# Patient Record
Sex: FEMALE | Race: WHITE | Employment: FULL TIME | URBAN - METROPOLITAN AREA
[De-identification: names, ages, dates, MRNs, and addresses within clinical notes are randomized per-mention and may not be internally consistent; named-entity substitution may affect disease eponyms.]

---

## 2022-09-28 ENCOUNTER — HOSPITAL ENCOUNTER (OUTPATIENT)
Dept: MAMMOGRAPHY | Facility: CLINIC | Age: 42
Discharge: HOME | End: 2022-09-28
Attending: OBSTETRICS & GYNECOLOGY | Admitting: OBSTETRICS & GYNECOLOGY
Payer: COMMERCIAL

## 2022-09-28 DIAGNOSIS — Z12.31 VISIT FOR SCREENING MAMMOGRAM: ICD-10-CM

## 2022-09-28 PROCEDURE — 77067 SCR MAMMO BI INCL CAD: CPT

## 2022-09-30 ENCOUNTER — HOSPITAL ENCOUNTER (OUTPATIENT)
Dept: ULTRASOUND IMAGING | Facility: CLINIC | Age: 42
Discharge: HOME | End: 2022-09-30
Attending: OBSTETRICS & GYNECOLOGY
Payer: COMMERCIAL

## 2022-09-30 ENCOUNTER — HOSPITAL ENCOUNTER (OUTPATIENT)
Dept: MAMMOGRAPHY | Facility: CLINIC | Age: 42
Discharge: HOME | End: 2022-09-30
Attending: OBSTETRICS & GYNECOLOGY
Payer: COMMERCIAL

## 2022-09-30 DIAGNOSIS — R92.8 ABNORMAL MAMMOGRAM: ICD-10-CM

## 2022-09-30 PROCEDURE — 76642 ULTRASOUND BREAST LIMITED: CPT | Mod: LT

## 2022-09-30 PROCEDURE — 77061 BREAST TOMOSYNTHESIS UNI: CPT | Mod: LT

## 2023-01-21 ENCOUNTER — HEALTH MAINTENANCE LETTER (OUTPATIENT)
Age: 43
End: 2023-01-21

## 2023-04-24 ENCOUNTER — HOSPITAL ENCOUNTER (OUTPATIENT)
Dept: MAMMOGRAPHY | Facility: CLINIC | Age: 43
Discharge: HOME | End: 2023-04-24
Attending: OBSTETRICS & GYNECOLOGY | Admitting: OBSTETRICS & GYNECOLOGY
Payer: COMMERCIAL

## 2023-04-24 DIAGNOSIS — R92.8 BI-RADS CATEGORY 3 MAMMOGRAM RESULT: ICD-10-CM

## 2023-04-24 PROCEDURE — 77061 BREAST TOMOSYNTHESIS UNI: CPT | Mod: LT

## 2023-12-15 ENCOUNTER — HOSPITAL ENCOUNTER (OUTPATIENT)
Dept: MAMMOGRAPHY | Facility: CLINIC | Age: 43
Discharge: HOME | End: 2023-12-15
Attending: OBSTETRICS & GYNECOLOGY | Admitting: OBSTETRICS & GYNECOLOGY
Payer: COMMERCIAL

## 2023-12-15 DIAGNOSIS — R92.8 BI-RADS CATEGORY 3 MAMMOGRAM RESULT: ICD-10-CM

## 2023-12-15 PROCEDURE — 77062 BREAST TOMOSYNTHESIS BI: CPT

## 2024-07-07 ENCOUNTER — HEALTH MAINTENANCE LETTER (OUTPATIENT)
Age: 44
End: 2024-07-07

## 2024-09-09 ENCOUNTER — OFFICE VISIT (OUTPATIENT)
Dept: URGENT CARE | Facility: URGENT CARE | Age: 44
End: 2024-09-09
Payer: COMMERCIAL

## 2024-09-09 VITALS
OXYGEN SATURATION: 97 % | SYSTOLIC BLOOD PRESSURE: 138 MMHG | TEMPERATURE: 98.7 F | DIASTOLIC BLOOD PRESSURE: 90 MMHG | HEART RATE: 83 BPM

## 2024-09-09 DIAGNOSIS — R06.2 WHEEZING: ICD-10-CM

## 2024-09-09 DIAGNOSIS — J01.90 ACUTE SINUSITIS WITH SYMPTOMS > 10 DAYS: Primary | ICD-10-CM

## 2024-09-09 PROCEDURE — 99203 OFFICE O/P NEW LOW 30 MIN: CPT | Mod: 25 | Performed by: PHYSICIAN ASSISTANT

## 2024-09-09 PROCEDURE — 94640 AIRWAY INHALATION TREATMENT: CPT | Performed by: PHYSICIAN ASSISTANT

## 2024-09-09 RX ORDER — PREDNISONE 20 MG/1
40 TABLET ORAL DAILY
Qty: 10 TABLET | Refills: 0 | Status: SHIPPED | OUTPATIENT
Start: 2024-09-09 | End: 2024-09-14

## 2024-09-09 RX ORDER — ALBUTEROL SULFATE 90 UG/1
1-2 AEROSOL, METERED RESPIRATORY (INHALATION) EVERY 6 HOURS
Qty: 8.5 G | Refills: 0 | Status: SHIPPED | OUTPATIENT
Start: 2024-09-09 | End: 2024-09-16

## 2024-09-09 RX ORDER — ALBUTEROL SULFATE 0.83 MG/ML
2.5 SOLUTION RESPIRATORY (INHALATION) ONCE
Status: COMPLETED | OUTPATIENT
Start: 2024-09-09 | End: 2024-09-09

## 2024-09-09 RX ORDER — BENZONATATE 100 MG/1
100 CAPSULE ORAL 3 TIMES DAILY PRN
Qty: 30 CAPSULE | Refills: 0 | Status: SHIPPED | OUTPATIENT
Start: 2024-09-09 | End: 2024-09-16

## 2024-09-09 RX ORDER — DOXYCYCLINE 100 MG/1
100 CAPSULE ORAL 2 TIMES DAILY
Qty: 14 CAPSULE | Refills: 0 | Status: SHIPPED | OUTPATIENT
Start: 2024-09-09 | End: 2024-09-16

## 2024-09-09 RX ADMIN — ALBUTEROL SULFATE 2.5 MG: 0.83 SOLUTION RESPIRATORY (INHALATION) at 18:59

## 2024-09-09 NOTE — PROGRESS NOTES
URGENT CARE VISIT:    SUBJECTIVE:   Regla Bosch is a 44 year old female presenting with a chief complaint of stuffy nose, cough - productive, facial pain/pressure, and headache.  Onset was 2 week(s) ago.   She denies the following symptoms: vomiting and diarrhea  Course of illness is worsening.    Treatment measures tried include Mucinex with no relief of symptoms.  Predisposing factors include None.    PMH:   Past Medical History:   Diagnosis Date    Mononucleosis age 16    Rh incompatibility      Allergies: Amoxicillin and Penicillins   Medications:   Current Outpatient Medications   Medication Sig Dispense Refill    albuterol (PROAIR HFA/PROVENTIL HFA/VENTOLIN HFA) 108 (90 Base) MCG/ACT inhaler Inhale 1-2 puffs into the lungs every 6 hours for 5 days. 8.5 g 0    benzonatate (TESSALON) 100 MG capsule Take 1 capsule (100 mg) by mouth 3 times daily as needed for cough. 30 capsule 0    doxycycline hyclate (VIBRAMYCIN) 100 MG capsule Take 1 capsule (100 mg) by mouth 2 times daily for 7 days. 14 capsule 0    predniSONE (DELTASONE) 20 MG tablet Take 2 tablets (40 mg) by mouth daily for 5 days. 10 tablet 0    acetaminophen (TYLENOL) 325 MG tablet Take 2 tablets (650 mg) by mouth every 4 hours as needed for mild pain or fever (greater than or equal to 38  C /100.4  F (oral) or 38.5  C/ 101.4  F (core).) 100 tablet     hydrocortisone 2.5 % cream Place rectally 3 times daily as needed (hemorrhoids)      ibuprofen (ADVIL,MOTRIN) 400 MG tablet Take 1-2 tablets (400-800 mg) by mouth every 6 hours as needed for other (cramping) 120 tablet     lanolin ointment Apply topically every hour as needed for dry skin (sore nipples)      Prenatal Vit-Fe Fumarate-FA (PRENATAL MULTIVITAMIN  PLUS IRON) 27-0.8 MG TABS Take 1 tablet by mouth daily      senna-docusate (SENOKOT-S;PERICOLACE) 8.6-50 MG per tablet Take 1-2 tablets by mouth 2 times daily 100 tablet      Social History:   Social History     Tobacco Use    Smoking status: Former      Current packs/day: 0.00     Types: Cigarettes     Quit date: 8/15/2010     Years since quittin.0    Smokeless tobacco: Not on file   Substance Use Topics    Alcohol use: No     Comment: OCC.     Family History:  I have reviewed this patient's family history and updated it with pertinent information if needed.  Family History   Problem Relation Age of Onset    Circulatory Father     Hypertension Father     Cerebrovascular Disease Sister         Twin sister    Ovarian Cancer Maternal Grandmother     Cancer Maternal Grandmother     C.A.D. Maternal Grandfather     Heart Disease Maternal Grandfather     Diabetes Paternal Grandfather     Arthritis Paternal Grandfather        ROS:  General: negative  Skin: negative  Eyes: negative  Ears/Nose/Throat: nasal congestion  Respiratory: Cough  Cardiovascular: negative  Gastrointestinal: negative  Genitourinary: negative  Musculoskeletal: negative  Neurologic: negative  Psychiatric: negative  Hematologic/Lymphatic/Immunologic: negative  Endocrine: negative      OBJECTIVE:  BP (!) 138/90 (BP Location: Right arm, Patient Position: Sitting, Cuff Size: Adult Large)   Pulse 83   Temp 98.7  F (37.1  C) (Tympanic)   SpO2 97%   GENERAL APPEARANCE: healthy, alert and no distress  HEAD: atraumatic  EYES: EOMI,  PERRL, conjunctiva clear  HENT: ear canals and TM's normal.  Nose and mouth without ulcers, erythema or lesions  NECK: supple, nontender, no lymphadenopathy  RESP: expiratory wheezes throughout. [place] louder on left upper lung lobe  CV: regular rates and rhythm, normal S1 S2, no murmur noted  SKIN: no suspicious lesions or rashes  NEURO: alert and oriented  PSYCH: normal mood and affect      ASSESSMENT:    ICD-10-CM    1. Acute sinusitis with symptoms > 10 days  J01.90 doxycycline hyclate (VIBRAMYCIN) 100 MG capsule      2. Wheezing  R06.2 albuterol (PROVENTIL) neb solution 2.5 mg     albuterol (PROAIR HFA/PROVENTIL HFA/VENTOLIN HFA) 108 (90 Base) MCG/ACT inhaler      predniSONE (DELTASONE) 20 MG tablet     benzonatate (TESSALON) 100 MG capsule          PLAN:  Patient Instructions   Patient was educated on the natural course of condition. Take medications as directed. Side effects discussed. Conservative measures discussed including rest, increased fluids, humidifier, and over-the-counter cough suppressants. See your primary care provider if symptoms do not improve in 5 days. Seek emergency care if you develop shortness of breath or fever over 103.    Patient verbalized understanding and is agreeable to plan. The patient was discharged ambulatory and in stable condition.    Pebbles Carrillo PA-C ....................  9/9/2024   7:12 PM

## 2024-09-10 NOTE — PATIENT INSTRUCTIONS
Patient was educated on the natural course of condition. Take medications as directed. Side effects discussed. Conservative measures discussed including rest, increased fluids, humidifier, and over-the-counter cough suppressants. See your primary care provider if symptoms do not improve in 5 days. Seek emergency care if you develop shortness of breath or fever over 103.

## 2024-09-10 NOTE — NURSING NOTE
Clinic Administered Medication Documentation    Patient was given albuterol sulfate 2.5mg/3ml. Prior to medication administration, verified patient's identity using patient's name and date of birth.    Jaime Desouza, CMA

## 2024-09-16 ENCOUNTER — OFFICE VISIT (OUTPATIENT)
Dept: URGENT CARE | Facility: URGENT CARE | Age: 44
End: 2024-09-16
Payer: COMMERCIAL

## 2024-09-16 VITALS
BODY MASS INDEX: 35.48 KG/M2 | OXYGEN SATURATION: 97 % | WEIGHT: 206.7 LBS | HEART RATE: 107 BPM | DIASTOLIC BLOOD PRESSURE: 91 MMHG | SYSTOLIC BLOOD PRESSURE: 133 MMHG | TEMPERATURE: 98 F | RESPIRATION RATE: 20 BRPM

## 2024-09-16 DIAGNOSIS — J01.00 ACUTE NON-RECURRENT MAXILLARY SINUSITIS: Primary | ICD-10-CM

## 2024-09-16 PROCEDURE — 99214 OFFICE O/P EST MOD 30 MIN: CPT | Performed by: FAMILY MEDICINE

## 2024-09-16 RX ORDER — CEFUROXIME AXETIL 500 MG/1
500 TABLET ORAL 2 TIMES DAILY
Qty: 14 TABLET | Refills: 0 | Status: SHIPPED | OUTPATIENT
Start: 2024-09-16 | End: 2024-09-23

## 2024-09-16 RX ORDER — PREDNISONE 20 MG/1
20 TABLET ORAL DAILY
Qty: 5 TABLET | Refills: 0 | Status: SHIPPED | OUTPATIENT
Start: 2024-09-16 | End: 2024-09-21

## 2024-09-16 NOTE — PROGRESS NOTES
Assessment & Plan     Acute non-recurrent maxillary sinusitis  - predniSONE (DELTASONE) 20 MG tablet  Dispense: 5 tablet; Refill: 0  - cefuroxime (CEFTIN) 500 MG tablet  Dispense: 14 tablet; Refill: 0       Vital signs unremarkable lungs continue to show mild wheezing and expiratory more prominent in the upper lung fields  We discussed the risk of starting another course of antibiotics which may include diarrhea or rash.    Given her ongoing sinus pressure and mild wheezing I extended her course of prednisone but at a smaller dose of 20 mg for 5 days.  She is to also start nasal fluticasone steroid spray to help with inflammation on exam she had a slight septal spur in the mid septum on the left side with associated inflamed turbinates compared to the right side.  No obvious polyps were seen.    Should symptoms not improve in the next 5 days a referral to ENT was provided to her    Ramón Sandy MD   [facility]    Taiwo Arauz is a 44 year old female who presents to clinic today for the following health issues:  Chief Complaint   Patient presents with    URI     Start 24 sx sometimes productive cough w/clear flem, chest congestion, head pressure, hard to hear, still not sleeping through the night tx Doxycycline, Tessalon, albuterol and completed the prednisone      HPI    Patient reporting marginal improvement with her sinus pressure mainly in the frontal region she continues to have some drainage and occasional productive cough.  She finished her course of prednisone which was given to her for 5 days additionally has been using albuterol from time to time.  Has had a handful of sinus infections in her lifetime but prior to this episode had been many years.  No known history of septal deviation.  She has no new shortness of breath or worsening cough.  No new fevers.        Patient Active Problem List    Diagnosis Date Noted     (normal spontaneous vaginal delivery) 2016      Priority: Medium    Indication for care in labor or delivery 06/10/2016     Priority: Medium       Current Outpatient Medications   Medication Sig Dispense Refill    albuterol (PROAIR HFA/PROVENTIL HFA/VENTOLIN HFA) 108 (90 Base) MCG/ACT inhaler Inhale 1-2 puffs into the lungs every 6 hours for 5 days. 8.5 g 0    benzonatate (TESSALON) 100 MG capsule Take 1 capsule (100 mg) by mouth 3 times daily as needed for cough. 30 capsule 0    cefuroxime (CEFTIN) 500 MG tablet Take 1 tablet (500 mg) by mouth 2 times daily for 7 days. 14 tablet 0    doxycycline hyclate (VIBRAMYCIN) 100 MG capsule Take 1 capsule (100 mg) by mouth 2 times daily for 7 days. 14 capsule 0    predniSONE (DELTASONE) 20 MG tablet Take 1 tablet (20 mg) by mouth daily for 5 days. 5 tablet 0    acetaminophen (TYLENOL) 325 MG tablet Take 2 tablets (650 mg) by mouth every 4 hours as needed for mild pain or fever (greater than or equal to 38  C /100.4  F (oral) or 38.5  C/ 101.4  F (core).) (Patient not taking: Reported on 9/16/2024) 100 tablet     hydrocortisone 2.5 % cream Place rectally 3 times daily as needed (hemorrhoids) (Patient not taking: Reported on 9/16/2024)      ibuprofen (ADVIL,MOTRIN) 400 MG tablet Take 1-2 tablets (400-800 mg) by mouth every 6 hours as needed for other (cramping) (Patient not taking: Reported on 9/16/2024) 120 tablet     lanolin ointment Apply topically every hour as needed for dry skin (sore nipples) (Patient not taking: Reported on 9/16/2024)      Prenatal Vit-Fe Fumarate-FA (PRENATAL MULTIVITAMIN  PLUS IRON) 27-0.8 MG TABS Take 1 tablet by mouth daily (Patient not taking: Reported on 9/16/2024)      senna-docusate (SENOKOT-S;PERICOLACE) 8.6-50 MG per tablet Take 1-2 tablets by mouth 2 times daily (Patient not taking: Reported on 9/16/2024) 100 tablet      No current facility-administered medications for this visit.           Objective    BP (!) 133/91   Pulse 107   Temp 98  F (36.7  C)   Resp 20   Wt 93.8 kg (206 lb  11.2 oz)   SpO2 97%   BMI 35.48 kg/m    Physical Exam       Nose: as noted above, no thick drainage  Pulm: as above , good aeration, no crackles  GEN: NAD  Throat: mild cobblestoning, no trismus  No results found for any visits on 09/16/24.                  The use of Dragon/Cyanogen[employer] services may have been used to construct the content in this note; any grammatical or spelling errors are non-intentional. Please contact the author of this note directly if you are in need of any clarification.

## 2024-09-16 NOTE — PATIENT INSTRUCTIONS
Nasal fluticasone/mometasone once a day  -- do this 15 minutes after your  nasal saline rinse      Continue the albuterol 2 puffs every 4 hours as needed for wheezing/coughing fits      Prednisone 20mg a day for 5 more days      Ceftin antibiotic twice a day for 7 days        If not better call [facility] [phone]

## 2025-05-04 ENCOUNTER — OFFICE VISIT (OUTPATIENT)
Dept: URGENT CARE | Facility: URGENT CARE | Age: 45
End: 2025-05-04
Payer: COMMERCIAL

## 2025-05-04 VITALS
OXYGEN SATURATION: 98 % | HEIGHT: 64 IN | DIASTOLIC BLOOD PRESSURE: 88 MMHG | TEMPERATURE: 101.1 F | HEART RATE: 101 BPM | RESPIRATION RATE: 20 BRPM | SYSTOLIC BLOOD PRESSURE: 143 MMHG | WEIGHT: 209.1 LBS | BODY MASS INDEX: 35.7 KG/M2

## 2025-05-04 DIAGNOSIS — R50.9 FEVER IN ADULT: ICD-10-CM

## 2025-05-04 DIAGNOSIS — J02.9 ACUTE PHARYNGITIS, UNSPECIFIED ETIOLOGY: Primary | ICD-10-CM

## 2025-05-04 DIAGNOSIS — R07.0 THROAT PAIN: ICD-10-CM

## 2025-05-04 LAB
DEPRECATED S PYO AG THROAT QL EIA: NEGATIVE
FLUAV AG SPEC QL IA: NEGATIVE
FLUBV AG SPEC QL IA: NEGATIVE

## 2025-05-04 PROCEDURE — 3077F SYST BP >= 140 MM HG: CPT | Performed by: INTERNAL MEDICINE

## 2025-05-04 PROCEDURE — 99213 OFFICE O/P EST LOW 20 MIN: CPT | Performed by: INTERNAL MEDICINE

## 2025-05-04 PROCEDURE — 3079F DIAST BP 80-89 MM HG: CPT | Performed by: INTERNAL MEDICINE

## 2025-05-04 PROCEDURE — 87804 INFLUENZA ASSAY W/OPTIC: CPT | Performed by: INTERNAL MEDICINE

## 2025-05-04 PROCEDURE — 87651 STREP A DNA AMP PROBE: CPT | Performed by: INTERNAL MEDICINE

## 2025-05-04 RX ORDER — LIDOCAINE HYDROCHLORIDE 20 MG/ML
15 SOLUTION OROPHARYNGEAL
Qty: 100 ML | Refills: 0 | Status: SHIPPED | OUTPATIENT
Start: 2025-05-04

## 2025-05-04 NOTE — PROGRESS NOTES
Urgent Care Clinic Visit    Chief Complaint   Patient presents with    Urgent Care    Pharyngitis     Sore throat last night, feel nausea, and feels swollen.               5/4/2025     5:36 PM   Additional Questions   Roomed by Dawb   Accompanied by self     No  Does the patient have a sore throat and either history of fever >100.4 in the previous 24 hours without a cough or recent exposure to a known case of strep throat? Yes   Pre-Provider Visit Orders- Influenza  Is this currently Influenza testing season?:  Yes  Does the patient present with a fever and either bodyaches, fatigue, or cough that have been present less than 48 hours? Yes

## 2025-05-05 LAB — S PYO DNA THROAT QL NAA+PROBE: NOT DETECTED

## 2025-07-13 ENCOUNTER — HEALTH MAINTENANCE LETTER (OUTPATIENT)
Age: 45
End: 2025-07-13